# Patient Record
Sex: FEMALE | NOT HISPANIC OR LATINO | Employment: OTHER | URBAN - NONMETROPOLITAN AREA
[De-identification: names, ages, dates, MRNs, and addresses within clinical notes are randomized per-mention and may not be internally consistent; named-entity substitution may affect disease eponyms.]

---

## 2018-04-05 ENCOUNTER — OFFICE VISIT (OUTPATIENT)
Dept: GASTROENTEROLOGY | Facility: CLINIC | Age: 53
End: 2018-04-05

## 2018-04-05 ENCOUNTER — PREP FOR SURGERY (OUTPATIENT)
Dept: OTHER | Facility: HOSPITAL | Age: 53
End: 2018-04-05

## 2018-04-05 VITALS
RESPIRATION RATE: 16 BRPM | HEIGHT: 61 IN | SYSTOLIC BLOOD PRESSURE: 138 MMHG | DIASTOLIC BLOOD PRESSURE: 77 MMHG | HEART RATE: 83 BPM | TEMPERATURE: 98.5 F | WEIGHT: 181 LBS | BODY MASS INDEX: 34.17 KG/M2

## 2018-04-05 DIAGNOSIS — R11.0 NAUSEA: Chronic | ICD-10-CM

## 2018-04-05 DIAGNOSIS — R13.10 DYSPHAGIA, UNSPECIFIED TYPE: Chronic | ICD-10-CM

## 2018-04-05 DIAGNOSIS — R13.10 DYSPHAGIA, UNSPECIFIED TYPE: ICD-10-CM

## 2018-04-05 DIAGNOSIS — R11.0 NAUSEA: Primary | ICD-10-CM

## 2018-04-05 DIAGNOSIS — Z12.11 COLON CANCER SCREENING: Primary | ICD-10-CM

## 2018-04-05 DIAGNOSIS — R19.7 DIARRHEA, UNSPECIFIED TYPE: ICD-10-CM

## 2018-04-05 DIAGNOSIS — R19.7 DIARRHEA, UNSPECIFIED TYPE: Primary | ICD-10-CM

## 2018-04-05 DIAGNOSIS — Z12.11 COLON CANCER SCREENING: ICD-10-CM

## 2018-04-05 PROBLEM — E78.5 HYPERLIPIDEMIA LDL GOAL <130: Status: ACTIVE | Noted: 2018-04-05

## 2018-04-05 PROBLEM — E11.9 TYPE 2 DIABETES MELLITUS WITHOUT COMPLICATION, WITHOUT LONG-TERM CURRENT USE OF INSULIN (HCC): Status: ACTIVE | Noted: 2018-04-05

## 2018-04-05 PROBLEM — Z13.89 ENCOUNTER FOR SCREENING FOR OTHER DISORDER: Status: ACTIVE | Noted: 2018-02-20

## 2018-04-05 PROBLEM — I10 ESSENTIAL HYPERTENSION: Status: ACTIVE | Noted: 2018-04-05

## 2018-04-05 PROCEDURE — 99244 OFF/OP CNSLTJ NEW/EST MOD 40: CPT | Performed by: NURSE PRACTITIONER

## 2018-04-05 RX ORDER — SODIUM CHLORIDE 9 MG/ML
70 INJECTION, SOLUTION INTRAVENOUS CONTINUOUS PRN
Status: CANCELLED | OUTPATIENT
Start: 2018-04-05

## 2018-04-05 RX ORDER — LORATADINE 10 MG/1
10 TABLET ORAL DAILY
COMMUNITY

## 2018-04-05 RX ORDER — ROSUVASTATIN CALCIUM 10 MG/1
10 TABLET, COATED ORAL DAILY
COMMUNITY

## 2018-04-05 RX ORDER — HYDROCHLOROTHIAZIDE 12.5 MG/1
12.5 TABLET ORAL DAILY
COMMUNITY

## 2018-04-05 RX ORDER — LOSARTAN POTASSIUM 25 MG/1
25 TABLET ORAL 2 TIMES DAILY
COMMUNITY

## 2018-04-05 RX ORDER — VENLAFAXINE HYDROCHLORIDE 75 MG/1
75 CAPSULE, EXTENDED RELEASE ORAL DAILY
COMMUNITY

## 2018-04-05 NOTE — PATIENT INSTRUCTIONS
1. Zofran 4 mg 1 po every 8 hours as needed for nausea.  2. Gastrointestinal stool panel.  3. Upper endoscopy-EGD: Description of the procedure, risks, benefits, alternatives and options, including nonoperative options, were discussed with the patient in detail. The patient understands and wishes to proceed.  4. Colonoscopy: Description of the procedure, risks, benefits, alternatives and options, including nonoperative options, were discussed with the patient in detail. The patient understands and wishes to proceed.

## 2018-04-05 NOTE — PROGRESS NOTES
"Chief Complaint   Patient presents with   • Colon Cancer Screening   • Nausea   • Hematochezia   • Diarrhea     The patient has a history of diarrhea that started about 3 days ago. The patient had been taking Cipro for a urinary tract infection. She stopped the Cipro 2 days ago, but the diarrhea has continued. The patient is having a bowel movement every 30 minutes around the clock for the past 3 days. Diarrhea is severe. Diarrhea is very watery. The patient wakes at night with diarrhea. The patient denies bright red blood per rectum or melena. The patient is not taking anything for diarrhea.The patient denies abdominal pain, but she does feel \"gassy\".     The patient has a sensation of \"something\" in her esophagus. The patient had multiple episodes of nausea with vomiting about 1 month ago that lasted about 15 minutes. The patient has not had any further episodes of vomiting, but she has continued to feel nauseated. The patient also feels like she has \"something\" in her throat. No history of hematemesis. The patient is not taking anything for the nausea. The patient does not have difficulty swallowing liquids or solids, but drinking coffee will make her feel \"sick\". There is no history of heartburn.    The patient has not had a colonoscopy in the past. There is no family history of colon cancer.    Nausea   This is a recurrent problem. The current episode started more than 1 month ago. The problem occurs intermittently. The problem has been waxing and waning. Associated symptoms include a fever, myalgias and nausea. Pertinent negatives include no abdominal pain, arthralgias, chest pain, chills, coughing, fatigue, headaches, joint swelling, rash or vomiting. The symptoms are aggravated by drinking. She has tried nothing for the symptoms.   Diarrhea    This is a new problem. Episode onset: 4/2/2018. The problem occurs more than 10 times per day. The problem has been unchanged. The stool consistency is described as " watery. The patient states that diarrhea awakens her from sleep. Associated symptoms include a fever and myalgias. Pertinent negatives include no abdominal pain, arthralgias, chills, coughing, headaches or vomiting. Nothing aggravates the symptoms. Risk factors include recent antibiotic use. She has tried nothing for the symptoms.   Difficulty Swallowing   This is a recurrent problem. The current episode started more than 1 month ago. The problem occurs intermittently. The problem has been waxing and waning. Associated symptoms include a fever, myalgias and nausea. Pertinent negatives include no abdominal pain, arthralgias, chest pain, chills, coughing, fatigue, headaches, joint swelling, rash or vomiting. The symptoms are aggravated by eating. She has tried nothing for the symptoms.      Review of Systems   Constitutional: Positive for fever. Negative for appetite change, chills, fatigue and unexpected weight change.   HENT: Negative for mouth sores, nosebleeds and trouble swallowing.    Eyes: Negative for discharge and redness.   Respiratory: Negative for apnea, cough and shortness of breath.    Cardiovascular: Negative for chest pain, palpitations and leg swelling.   Gastrointestinal: Positive for blood in stool, diarrhea, hematochezia and nausea. Negative for abdominal distention, abdominal pain, anal bleeding, constipation and vomiting.   Endocrine: Negative for cold intolerance, heat intolerance and polydipsia.   Genitourinary: Positive for dysuria and hematuria. Negative for urgency.   Musculoskeletal: Positive for myalgias. Negative for arthralgias and joint swelling.   Skin: Negative for rash.   Allergic/Immunologic: Positive for food allergies (Fruits:  Banana and canteloupe are the worst.). Negative for immunocompromised state.   Neurological: Negative for dizziness, seizures, syncope and headaches.   Hematological: Positive for adenopathy (in the groin area). Does not bruise/bleed easily.  "  Psychiatric/Behavioral: Negative for dysphoric mood. The patient is not nervous/anxious and is not hyperactive.      Patient Active Problem List   Diagnosis   • Body mass index (bmi) 33.0-33.9, adult   • Diarrhea   • Encounter for screening for other disorder   • Essential hypertension   • Hyperlipidemia LDL goal <130   • Colon cancer screening   • Type 2 diabetes mellitus without complication, without long-term current use of insulin   • Nausea   • Dysphagia     Past Medical History:   Diagnosis Date   • Diabetes 2015   • Fatigue    • HTN (hypertension) 2014   • OA (osteoarthritis) 1995   • Snores    • Tattoo      Past Surgical History:   Procedure Laterality Date   • HYSTERECTOMY  1999    left 1 ovary   • ORBITAL RECONSTRUCTION  2008    Facial/Orbital    • REPLACEMENT TOTAL KNEE Bilateral 2014, 2015     Family History   Problem Relation Age of Onset   • Liver cancer Maternal Uncle      x 2   • Colon cancer Neg Hx    • Inflammatory bowel disease Neg Hx      Social History   Substance Use Topics   • Smoking status: Former Smoker     Packs/day: 0.50     Types: Cigarettes     Start date: 1988     Quit date: 1989   • Smokeless tobacco: Never Used   • Alcohol use Not on file      Comment: \"Social\"       Current Outpatient Prescriptions:   •  hydrochlorothiazide (HYDRODIURIL) 12.5 MG tablet, Take 12.5 mg by mouth Daily., Disp: , Rfl:   •  loratadine (CLARITIN) 10 MG tablet, Take 10 mg by mouth Daily., Disp: , Rfl:   •  losartan (COZAAR) 25 MG tablet, Take 25 mg by mouth 2 (Two) Times a Day., Disp: , Rfl:   •  metFORMIN (GLUCOPHAGE) 1000 MG tablet, Take 1,000 mg by mouth 2 (Two) Times a Day., Disp: , Rfl:   •  MULTIPLE VITAMIN PO, Take 1 tablet by mouth Daily., Disp: , Rfl:   •  Probiotic Product (PROBIOTIC DAILY PO), Take 1 capsule by mouth Daily., Disp: , Rfl:   •  rosuvastatin (CRESTOR) 10 MG tablet, Take 10 mg by mouth Daily., Disp: , Rfl:   •  venlafaxine XR (EFFEXOR-XR) 75 MG 24 hr capsule, Take 75 mg by mouth " "Daily., Disp: , Rfl:     Allergies   Allergen Reactions   • Penicillins Anaphylaxis   • Codeine GI Intolerance     \"Constipation, Ringing in the ears\"   • Latex Rash     /77   Pulse 83   Temp 98.5 °F (36.9 °C)   Resp 16   Ht 154.9 cm (61\")   Wt 82.1 kg (181 lb)   LMP  (LMP Unknown)   BMI 34.20 kg/m²     Physical Exam   Constitutional: She is oriented to person, place, and time. She appears well-developed and well-nourished. No distress.   HENT:   Head: Normocephalic and atraumatic.   Right Ear: Hearing and external ear normal.   Left Ear: Hearing and external ear normal.   Nose: Nose normal.   Mouth/Throat: Oropharynx is clear and moist and mucous membranes are normal. Mucous membranes are not pale, not dry and not cyanotic. No oral lesions. No oropharyngeal exudate.   Eyes: Conjunctivae and EOM are normal. Right eye exhibits no discharge. Left eye exhibits no discharge.   Neck: Trachea normal. Neck supple. No JVD present. No edema present. No thyroid mass and no thyromegaly present.   Cardiovascular: Normal rate, regular rhythm, S2 normal and normal heart sounds.  Exam reveals no gallop, no S3 and no friction rub.    No murmur heard.  Pulmonary/Chest: Effort normal and breath sounds normal. No respiratory distress. She exhibits no tenderness.   Abdominal: Normal appearance and bowel sounds are normal. She exhibits no distension, no ascites and no mass. There is no splenomegaly or hepatomegaly. There is tenderness (mild) in the suprapubic area. There is no rigidity, no rebound and no guarding. No hernia.     Vascular Status -  Her right foot exhibits no edema. Her left foot exhibits no edema.  Lymphadenopathy:     She has no cervical adenopathy.        Left: No supraclavicular adenopathy present.   Neurological: She is alert and oriented to person, place, and time. She has normal strength. No cranial nerve deficit or sensory deficit.   Skin: No rash noted. She is not diaphoretic. No cyanosis. No pallor. " Nails show no clubbing.   Psychiatric: She has a normal mood and affect.   Nursing note and vitals reviewed.  Stigmata of chronic liver disease:  None.  Asterixis:  None.    Assessment and Plan:    Nataliia was seen today for colon cancer screening, nausea, hematochezia and diarrhea.    Diagnoses and all orders for this visit:    Diarrhea, unspecified type  Comments:  Differentials include bacterial infection, microscopic colitis, underlying inflammatory bowel disease.  Orders:  -     Gastrointestinal Panel, PCR - Stool, Per Rectum; Future    Nausea  Comments:  Differentials include peptic ulcer disease, pancreatobiliary disease.    Dysphagia, unspecified type  Comments:  Differentials include esophagitis, esophageal dysmotility, Schatzki's ring.    Colon cancer screening  Comments:  The patient has not had a colonoscopy in the past.  There is no family history of colon cancer.        Plan  and Patient Instructions:  Patient Instructions   1. Zofran 4 mg 1 po every 8 hours as needed for nausea.  2. Gastrointestinal stool panel.  3. Upper endoscopy-EGD: Description of the procedure, risks, benefits, alternatives and options, including nonoperative options, were discussed with the patient in detail. The patient understands and wishes to proceed.  4. Colonoscopy: Description of the procedure, risks, benefits, alternatives and options, including nonoperative options, were discussed with the patient in detail. The patient understands and wishes to proceed.    CHANCE Carmona

## 2018-04-06 ENCOUNTER — LAB (OUTPATIENT)
Dept: LAB | Facility: HOSPITAL | Age: 53
End: 2018-04-06

## 2018-04-06 DIAGNOSIS — R19.7 DIARRHEA, UNSPECIFIED TYPE: ICD-10-CM

## 2018-04-06 LAB
ADV 40+41 DNA STL QL NAA+NON-PROBE: NOT DETECTED
ASTRO TYP 1-8 RNA STL QL NAA+NON-PROBE: NOT DETECTED
C CAYETANENSIS DNA STL QL NAA+NON-PROBE: NOT DETECTED
C DIFF TOX GENS STL QL NAA+PROBE: NOT DETECTED
CAMPY SP DNA.DIARRHEA STL QL NAA+PROBE: NOT DETECTED
CRYPTOSP STL CULT: NOT DETECTED
E COLI DNA SPEC QL NAA+PROBE: NOT DETECTED
E HISTOLYT AG STL-ACNC: NOT DETECTED
EAEC PAA PLAS AGGR+AATA ST NAA+NON-PRB: NOT DETECTED
EC STX1+STX2 GENES STL QL NAA+NON-PROBE: NOT DETECTED
EPEC EAE GENE STL QL NAA+NON-PROBE: NOT DETECTED
ETEC LTA+ST1A+ST1B TOX ST NAA+NON-PROBE: NOT DETECTED
G LAMBLIA DNA SPEC QL NAA+PROBE: NOT DETECTED
NOROVIRUS GI+II RNA STL QL NAA+NON-PROBE: NOT DETECTED
P SHIGELLOIDES DNA STL QL NAA+NON-PROBE: NOT DETECTED
RV RNA STL NAA+PROBE: NOT DETECTED
SALMONELLA DNA SPEC QL NAA+PROBE: NOT DETECTED
SAPO I+II+IV+V RNA STL QL NAA+NON-PROBE: NOT DETECTED
SHIGELLA SP+EIEC IPAH ST NAA+NON-PROBE: NOT DETECTED
V CHOLERAE DNA SPEC QL NAA+PROBE: NOT DETECTED
VIBRIO DNA SPEC NAA+PROBE: NOT DETECTED
YERSINIA STL CULT: NOT DETECTED

## 2018-04-06 PROCEDURE — 87507 IADNA-DNA/RNA PROBE TQ 12-25: CPT

## 2018-04-07 ENCOUNTER — APPOINTMENT (OUTPATIENT)
Dept: CT IMAGING | Facility: HOSPITAL | Age: 53
End: 2018-04-07

## 2018-04-07 ENCOUNTER — HOSPITAL ENCOUNTER (EMERGENCY)
Facility: HOSPITAL | Age: 53
Discharge: HOME | End: 2018-04-07
Attending: EMERGENCY MEDICINE | Admitting: EMERGENCY MEDICINE

## 2018-04-07 VITALS
TEMPERATURE: 98.3 F | HEIGHT: 61 IN | OXYGEN SATURATION: 98 % | HEART RATE: 76 BPM | SYSTOLIC BLOOD PRESSURE: 130 MMHG | RESPIRATION RATE: 18 BRPM | WEIGHT: 176 LBS | BODY MASS INDEX: 33.23 KG/M2 | DIASTOLIC BLOOD PRESSURE: 96 MMHG

## 2018-04-07 DIAGNOSIS — R19.7 DIARRHEA, UNSPECIFIED TYPE: Primary | ICD-10-CM

## 2018-04-07 LAB
ALBUMIN SERPL-MCNC: 4.4 G/DL (ref 3.5–5)
ALBUMIN/GLOB SERPL: 1.2 G/DL (ref 1–2)
ALP SERPL-CCNC: 80 U/L (ref 38–126)
ALT SERPL W P-5'-P-CCNC: 70 U/L (ref 13–69)
ANION GAP SERPL CALCULATED.3IONS-SCNC: 16.9 MMOL/L (ref 10–20)
AST SERPL-CCNC: 31 U/L (ref 15–46)
BACTERIA UR QL AUTO: ABNORMAL /HPF
BASOPHILS # BLD AUTO: 0.05 10*3/MM3 (ref 0–0.2)
BASOPHILS NFR BLD AUTO: 0.5 % (ref 0–2.5)
BILIRUB SERPL-MCNC: 0.4 MG/DL (ref 0.2–1.3)
BILIRUB UR QL STRIP: NEGATIVE
BUN BLD-MCNC: 12 MG/DL (ref 7–20)
BUN/CREAT SERPL: 20 (ref 7.1–23.5)
CALCIUM SPEC-SCNC: 10 MG/DL (ref 8.4–10.2)
CHLORIDE SERPL-SCNC: 102 MMOL/L (ref 98–107)
CLARITY UR: CLEAR
CO2 SERPL-SCNC: 29 MMOL/L (ref 26–30)
COLOR UR: YELLOW
CREAT BLD-MCNC: 0.6 MG/DL (ref 0.6–1.3)
D-LACTATE SERPL-SCNC: 1.1 MMOL/L (ref 0.5–2)
DEPRECATED RDW RBC AUTO: 42.3 FL (ref 37–54)
EOSINOPHIL # BLD AUTO: 0.34 10*3/MM3 (ref 0–0.7)
EOSINOPHIL NFR BLD AUTO: 3.6 % (ref 0–7)
ERYTHROCYTE [DISTWIDTH] IN BLOOD BY AUTOMATED COUNT: 12.9 % (ref 11.5–14.5)
GFR SERPL CREATININE-BSD FRML MDRD: 104 ML/MIN/1.73
GFR SERPL CREATININE-BSD FRML MDRD: 126 ML/MIN/1.73
GLOBULIN UR ELPH-MCNC: 3.7 GM/DL
GLUCOSE BLD-MCNC: 97 MG/DL (ref 74–98)
GLUCOSE UR STRIP-MCNC: NEGATIVE MG/DL
HCT VFR BLD AUTO: 38 % (ref 37–47)
HGB BLD-MCNC: 12.6 G/DL (ref 12–16)
HGB UR QL STRIP.AUTO: NEGATIVE
HYALINE CASTS UR QL AUTO: ABNORMAL /LPF
IMM GRANULOCYTES # BLD: 0.15 10*3/MM3 (ref 0–0.06)
IMM GRANULOCYTES NFR BLD: 1.6 % (ref 0–0.6)
KETONES UR QL STRIP: NEGATIVE
LEUKOCYTE ESTERASE UR QL STRIP.AUTO: ABNORMAL
LIPASE SERPL-CCNC: 131 U/L (ref 23–300)
LYMPHOCYTES # BLD AUTO: 2.85 10*3/MM3 (ref 0.6–3.4)
LYMPHOCYTES NFR BLD AUTO: 30.4 % (ref 10–50)
MCH RBC QN AUTO: 29.6 PG (ref 27–31)
MCHC RBC AUTO-ENTMCNC: 33.2 G/DL (ref 30–37)
MCV RBC AUTO: 89.2 FL (ref 81–99)
MONOCYTES # BLD AUTO: 0.62 10*3/MM3 (ref 0–0.9)
MONOCYTES NFR BLD AUTO: 6.6 % (ref 0–12)
NEUTROPHILS # BLD AUTO: 5.38 10*3/MM3 (ref 2–6.9)
NEUTROPHILS NFR BLD AUTO: 57.3 % (ref 37–80)
NITRITE UR QL STRIP: NEGATIVE
NRBC BLD MANUAL-RTO: 0 /100 WBC (ref 0–0)
PH UR STRIP.AUTO: 5.5 [PH] (ref 5–8)
PLAT MORPH BLD: NORMAL
PLATELET # BLD AUTO: 322 10*3/MM3 (ref 130–400)
PMV BLD AUTO: 8.9 FL (ref 6–12)
POTASSIUM BLD-SCNC: 3.9 MMOL/L (ref 3.5–5.1)
PROT SERPL-MCNC: 8.1 G/DL (ref 6.3–8.2)
PROT UR QL STRIP: NEGATIVE
RBC # BLD AUTO: 4.26 10*6/MM3 (ref 4.2–5.4)
RBC # UR: ABNORMAL /HPF
RBC MORPH BLD: NORMAL
REF LAB TEST METHOD: ABNORMAL
SODIUM BLD-SCNC: 144 MMOL/L (ref 137–145)
SP GR UR STRIP: 1.01 (ref 1–1.03)
SQUAMOUS #/AREA URNS HPF: ABNORMAL /HPF
TRANS CELLS #/AREA URNS HPF: ABNORMAL /HPF
UROBILINOGEN UR QL STRIP: ABNORMAL
WBC MORPH BLD: NORMAL
WBC NRBC COR # BLD: 9.39 10*3/MM3 (ref 4.8–10.8)
WBC UR QL AUTO: ABNORMAL /HPF

## 2018-04-07 PROCEDURE — 81001 URINALYSIS AUTO W/SCOPE: CPT | Performed by: PHYSICIAN ASSISTANT

## 2018-04-07 PROCEDURE — 99283 EMERGENCY DEPT VISIT LOW MDM: CPT

## 2018-04-07 PROCEDURE — 96374 THER/PROPH/DIAG INJ IV PUSH: CPT

## 2018-04-07 PROCEDURE — 25010000002 ONDANSETRON PER 1 MG: Performed by: PHYSICIAN ASSISTANT

## 2018-04-07 PROCEDURE — 74177 CT ABD & PELVIS W/CONTRAST: CPT

## 2018-04-07 PROCEDURE — 83605 ASSAY OF LACTIC ACID: CPT | Performed by: PHYSICIAN ASSISTANT

## 2018-04-07 PROCEDURE — 80053 COMPREHEN METABOLIC PANEL: CPT | Performed by: PHYSICIAN ASSISTANT

## 2018-04-07 PROCEDURE — 85025 COMPLETE CBC W/AUTO DIFF WBC: CPT | Performed by: PHYSICIAN ASSISTANT

## 2018-04-07 PROCEDURE — 83690 ASSAY OF LIPASE: CPT | Performed by: PHYSICIAN ASSISTANT

## 2018-04-07 PROCEDURE — 85007 BL SMEAR W/DIFF WBC COUNT: CPT | Performed by: PHYSICIAN ASSISTANT

## 2018-04-07 PROCEDURE — 25010000002 IOPAMIDOL 61 % SOLUTION: Performed by: EMERGENCY MEDICINE

## 2018-04-07 PROCEDURE — 87086 URINE CULTURE/COLONY COUNT: CPT | Performed by: PHYSICIAN ASSISTANT

## 2018-04-07 PROCEDURE — 96361 HYDRATE IV INFUSION ADD-ON: CPT

## 2018-04-07 RX ORDER — ONDANSETRON 2 MG/ML
4 INJECTION INTRAMUSCULAR; INTRAVENOUS ONCE
Status: COMPLETED | OUTPATIENT
Start: 2018-04-07 | End: 2018-04-07

## 2018-04-07 RX ORDER — SODIUM CHLORIDE 0.9 % (FLUSH) 0.9 %
10 SYRINGE (ML) INJECTION AS NEEDED
Status: DISCONTINUED | OUTPATIENT
Start: 2018-04-07 | End: 2018-04-07 | Stop reason: HOSPADM

## 2018-04-07 RX ADMIN — SODIUM CHLORIDE 1000 ML: 9 INJECTION, SOLUTION INTRAVENOUS at 16:37

## 2018-04-07 RX ADMIN — IOPAMIDOL 100 ML: 612 INJECTION, SOLUTION INTRAVENOUS at 17:46

## 2018-04-07 RX ADMIN — ONDANSETRON 4 MG: 2 INJECTION, SOLUTION INTRAMUSCULAR; INTRAVENOUS at 16:36

## 2018-04-07 NOTE — ED PROVIDER NOTES
"Subjective   52-year-old female presenting to the ER with complaints of recent diarrhea and abdominal pain.    She indicates that about 10 days ago she developed some dysuria and thought she might have a UTI.  She ended up going to the urgent care center and was placed on Cipro.  2 or 3 days after starting the Cipro she said she developed \"explosive diarrhea\" there was no blood, fever, chills or severe abdominal pain.  This actually ended up calming down but she did check with her PCP and was advised to stop the Cipro.  A stool culture for Clostridium difficile was obtained and taken to the lab yesterday-results pending.  She was placed on Flagyl over the past 24 hours and so far has taken 3 doses.  She said her diarrhea is no longer just water but it does have some jellylike consistency.  There is no blood, fever, chills.  She has had some episodic abdominal pain but very fleeting.  No back pain and currently no UTI symptoms.  He is using a probiotic as well.  She's never had any pertinent GI history.  She is a nonsmoker.  She did undergo partial hysterectomy previously.  Earlier this morning she was having some moderate severe crampy pain but on arrival here she said she did not even need any pain medicine but did have some nausea.  He is not having any vomiting.  Appetite is decreased.            Review of Systems   All other systems reviewed and are negative.      Past Medical History:   Diagnosis Date   • Diabetes 2015   • Fatigue    • HTN (hypertension) 2014   • OA (osteoarthritis) 1995   • Snores    • Tattoo        Allergies   Allergen Reactions   • Penicillins Anaphylaxis   • Codeine GI Intolerance     \"Constipation, Ringing in the ears\"   • Latex Rash       Past Surgical History:   Procedure Laterality Date   • HYSTERECTOMY  1999    left 1 ovary   • ORBITAL RECONSTRUCTION  2008    Facial/Orbital    • REPLACEMENT TOTAL KNEE Bilateral 2014, 2015       Family History   Problem Relation Age of Onset   • Liver " cancer Maternal Uncle      x 2   • Colon cancer Neg Hx    • Inflammatory bowel disease Neg Hx        Social History     Social History   • Marital status: Unknown     Social History Main Topics   • Smoking status: Former Smoker     Packs/day: 0.50     Types: Cigarettes     Start date: 1988     Quit date: 1989   • Smokeless tobacco: Never Used   • Drug use: No   • Sexual activity: Defer     Other Topics Concern   • Not on file           Objective   Physical Exam   Constitutional: She appears well-developed and well-nourished. No distress.   Very pleasant overweight female no acute distress does not appear to be in pain   HENT:   Head: Normocephalic and atraumatic.   Nose: Nose normal.   Mouth/Throat: Oropharynx is clear and moist.   Eyes: Conjunctivae and EOM are normal. No scleral icterus.   Neck: Normal range of motion.   Cardiovascular: Normal rate, regular rhythm, normal heart sounds and intact distal pulses.    No murmur heard.  Pulmonary/Chest: Effort normal and breath sounds normal. No respiratory distress. She has no wheezes. She has no rales.   Abdominal: Soft. Bowel sounds are normal.   Bowel sounds are present throughout abdomen is nonrigid.  She does have mild to moderate diffuse upper abdominal tenderness and mild to moderate left lower quadrant tenderness.  No mass guarding or rebound.  No pulsatile mass.  No CVA tenderness.   Musculoskeletal: Normal range of motion. She exhibits no edema or tenderness.   Neurological: She is alert. No cranial nerve deficit. She exhibits normal muscle tone. Coordination normal.   Skin: Skin is warm. Capillary refill takes less than 2 seconds. No rash noted. She is not diaphoretic.   Psychiatric: She has a normal mood and affect. Her behavior is normal. Thought content normal.   Vitals reviewed.      Procedures         ED Course  ED Course   Comment By Time   Patient has had an uneventful ED course.  She did request Zofran but did not need anything for pain.   Fortunately, her CT scan only shows some gallbladder sludge and a fatty liver but no other acute intra-abdominal process.  Her labs are very reassuring with no leukocytosis, anemia, elevated lactate or electrolyte abnormality.  I told her that she should continue with her Flagyl as previously prescribed and try to follow up with her PCP over the next few days-first of the week Dave Alexandra PA-C 04/07 1818                  MDM    Final diagnoses:   Diarrhea, unspecified type            Dave Alexandra PA-C  04/07/18 [id number]

## 2018-04-07 NOTE — DISCHARGE INSTRUCTIONS
Please continue all medicines as previously prescribed.  Try to follow-up with your PCP this Monday or Tuesday.  Return to the emergency room any time if you develop any significant change-worsening of current symptoms

## 2018-04-09 ENCOUNTER — TELEPHONE (OUTPATIENT)
Dept: GASTROENTEROLOGY | Facility: CLINIC | Age: 53
End: 2018-04-09

## 2018-04-09 LAB — BACTERIA SPEC AEROBE CULT: NO GROWTH

## 2018-04-09 NOTE — TELEPHONE ENCOUNTER
----- Message from CHANCE Chester sent at 4/9/2018  9:03 AM EDT -----  Let the patient know her stool tests were negative.

## 2018-05-07 ENCOUNTER — ANESTHESIA (OUTPATIENT)
Dept: GASTROENTEROLOGY | Facility: HOSPITAL | Age: 53
End: 2018-05-07

## 2018-05-07 ENCOUNTER — HOSPITAL ENCOUNTER (OUTPATIENT)
Facility: HOSPITAL | Age: 53
Setting detail: HOSPITAL OUTPATIENT SURGERY
Discharge: HOME | End: 2018-05-07
Attending: INTERNAL MEDICINE | Admitting: INTERNAL MEDICINE

## 2018-05-07 ENCOUNTER — ANESTHESIA EVENT (OUTPATIENT)
Dept: GASTROENTEROLOGY | Facility: HOSPITAL | Age: 53
End: 2018-05-07

## 2018-05-07 VITALS
BODY MASS INDEX: 33.23 KG/M2 | RESPIRATION RATE: 18 BRPM | TEMPERATURE: 98.4 F | HEIGHT: 61 IN | OXYGEN SATURATION: 98 % | DIASTOLIC BLOOD PRESSURE: 75 MMHG | HEART RATE: 72 BPM | WEIGHT: 176 LBS | SYSTOLIC BLOOD PRESSURE: 124 MMHG

## 2018-05-07 DIAGNOSIS — R11.0 NAUSEA: ICD-10-CM

## 2018-05-07 DIAGNOSIS — R19.7 DIARRHEA, UNSPECIFIED TYPE: ICD-10-CM

## 2018-05-07 DIAGNOSIS — Z12.11 COLON CANCER SCREENING: ICD-10-CM

## 2018-05-07 DIAGNOSIS — R13.10 DYSPHAGIA, UNSPECIFIED TYPE: ICD-10-CM

## 2018-05-07 LAB — GLUCOSE BLDC GLUCOMTR-MCNC: 120 MG/DL (ref 70–130)

## 2018-05-07 PROCEDURE — 82962 GLUCOSE BLOOD TEST: CPT

## 2018-05-07 PROCEDURE — 25010000002 PROPOFOL 200 MG/20ML EMULSION: Performed by: NURSE ANESTHETIST, CERTIFIED REGISTERED

## 2018-05-07 PROCEDURE — 45380 COLONOSCOPY AND BIOPSY: CPT | Performed by: INTERNAL MEDICINE

## 2018-05-07 PROCEDURE — 45385 COLONOSCOPY W/LESION REMOVAL: CPT | Performed by: INTERNAL MEDICINE

## 2018-05-07 PROCEDURE — 43239 EGD BIOPSY SINGLE/MULTIPLE: CPT | Performed by: INTERNAL MEDICINE

## 2018-05-07 PROCEDURE — 25010000002 MIDAZOLAM PER 1 MG: Performed by: NURSE ANESTHETIST, CERTIFIED REGISTERED

## 2018-05-07 DEVICE — DEV CLIP GI QUICKCLIPPRO FIX ROT 2300MM: Type: IMPLANTABLE DEVICE | Site: DESCENDING COLON | Status: FUNCTIONAL

## 2018-05-07 RX ORDER — PROPOFOL 10 MG/ML
INJECTION, EMULSION INTRAVENOUS AS NEEDED
Status: DISCONTINUED | OUTPATIENT
Start: 2018-05-07 | End: 2018-05-07 | Stop reason: SURG

## 2018-05-07 RX ORDER — RANITIDINE 150 MG/1
150 TABLET ORAL 2 TIMES DAILY
Qty: 60 TABLET | Refills: 1 | Status: SHIPPED | OUTPATIENT
Start: 2018-05-07

## 2018-05-07 RX ORDER — SODIUM CHLORIDE 0.9 % (FLUSH) 0.9 %
3 SYRINGE (ML) INJECTION AS NEEDED
Status: DISCONTINUED | OUTPATIENT
Start: 2018-05-07 | End: 2018-05-07 | Stop reason: HOSPADM

## 2018-05-07 RX ORDER — MEPERIDINE HYDROCHLORIDE 50 MG/ML
INJECTION INTRAMUSCULAR; INTRAVENOUS; SUBCUTANEOUS AS NEEDED
Status: DISCONTINUED | OUTPATIENT
Start: 2018-05-07 | End: 2018-05-07 | Stop reason: SURG

## 2018-05-07 RX ORDER — MIDAZOLAM HYDROCHLORIDE 1 MG/ML
INJECTION INTRAMUSCULAR; INTRAVENOUS AS NEEDED
Status: DISCONTINUED | OUTPATIENT
Start: 2018-05-07 | End: 2018-05-07 | Stop reason: SURG

## 2018-05-07 RX ORDER — SODIUM CHLORIDE 9 MG/ML
70 INJECTION, SOLUTION INTRAVENOUS CONTINUOUS PRN
Status: DISCONTINUED | OUTPATIENT
Start: 2018-05-07 | End: 2018-05-07 | Stop reason: HOSPADM

## 2018-05-07 RX ADMIN — PROPOFOL 30 MG: 10 INJECTION, EMULSION INTRAVENOUS at 12:48

## 2018-05-07 RX ADMIN — SODIUM CHLORIDE 70 ML/HR: 9 INJECTION, SOLUTION INTRAVENOUS at 10:12

## 2018-05-07 RX ADMIN — MEPERIDINE HYDROCHLORIDE 50 MG: 50 INJECTION, SOLUTION INTRAMUSCULAR; INTRAVENOUS; SUBCUTANEOUS at 12:02

## 2018-05-07 RX ADMIN — PROPOFOL 30 MG: 10 INJECTION, EMULSION INTRAVENOUS at 12:44

## 2018-05-07 RX ADMIN — PROPOFOL 30 MG: 10 INJECTION, EMULSION INTRAVENOUS at 12:50

## 2018-05-07 RX ADMIN — PROPOFOL 30 MG: 10 INJECTION, EMULSION INTRAVENOUS at 12:37

## 2018-05-07 RX ADMIN — PROPOFOL 30 MG: 10 INJECTION, EMULSION INTRAVENOUS at 12:25

## 2018-05-07 RX ADMIN — PROPOFOL 30 MG: 10 INJECTION, EMULSION INTRAVENOUS at 12:15

## 2018-05-07 RX ADMIN — PROPOFOL 30 MG: 10 INJECTION, EMULSION INTRAVENOUS at 12:12

## 2018-05-07 RX ADMIN — PROPOFOL 30 MG: 10 INJECTION, EMULSION INTRAVENOUS at 12:22

## 2018-05-07 RX ADMIN — PROPOFOL 30 MG: 10 INJECTION, EMULSION INTRAVENOUS at 12:46

## 2018-05-07 RX ADMIN — MIDAZOLAM HYDROCHLORIDE 2 MG: 1 INJECTION, SOLUTION INTRAMUSCULAR; INTRAVENOUS at 12:01

## 2018-05-07 RX ADMIN — PROPOFOL 30 MG: 10 INJECTION, EMULSION INTRAVENOUS at 12:10

## 2018-05-07 RX ADMIN — PROPOFOL 30 MG: 10 INJECTION, EMULSION INTRAVENOUS at 12:20

## 2018-05-07 RX ADMIN — PROPOFOL 30 MG: 10 INJECTION, EMULSION INTRAVENOUS at 12:42

## 2018-05-07 RX ADMIN — PROPOFOL 30 MG: 10 INJECTION, EMULSION INTRAVENOUS at 12:06

## 2018-05-07 RX ADMIN — PROPOFOL 30 MG: 10 INJECTION, EMULSION INTRAVENOUS at 12:30

## 2018-05-07 RX ADMIN — LIDOCAINE HYDROCHLORIDE 40 MG: 20 INJECTION, SOLUTION INTRAVENOUS at 12:41

## 2018-05-07 RX ADMIN — PROPOFOL 30 MG: 10 INJECTION, EMULSION INTRAVENOUS at 12:17

## 2018-05-07 RX ADMIN — PROPOFOL 30 MG: 10 INJECTION, EMULSION INTRAVENOUS at 12:40

## 2018-05-07 NOTE — H&P
"Chief complaint:  Diarrhea/Dysphagia    History of present illness: No previous colonscopy, Diarrhea, \"Gassy\" feeling, Dysphagia (feels like something in her esophagus), Nausea     Past medical history:   Past Medical History:   Diagnosis Date   • Diabetes 2015   • Fatigue    • High cholesterol    • HTN (hypertension) 2014   • OA (osteoarthritis) 1995   • Snores    • Tattoo    • Wears contact lenses        Surgical history:    Past Surgical History:   Procedure Laterality Date   • HYSTERECTOMY  1999    left 1 ovary   • ORBITAL RECONSTRUCTION  2008    Facial/Orbital    • REPLACEMENT TOTAL KNEE Bilateral 2014, 2015       Social history:  Social History     Social History   • Marital status:      Spouse name: N/A   • Number of children: N/A   • Years of education: N/A     Occupational History   • Not on file.     Social History Main Topics   • Smoking status: Former Smoker     Packs/day: 0.50     Years: 2.00     Types: Cigarettes     Start date: 1988     Quit date: 1989   • Smokeless tobacco: Never Used   • Alcohol use Not on file      Comment: \"Social\"   • Drug use: No   • Sexual activity: Defer     Other Topics Concern   • Not on file     Social History Narrative   • No narrative on file       Allergies:  Penicillins; Codeine; and Latex  Food Allergies:  Banana, Cantaloupe  Latex allergy: Yes  Contrast allergy: None    Medications:  Prescriptions Prior to Admission   Medication Sig Dispense Refill Last Dose   • hydrochlorothiazide (HYDRODIURIL) 12.5 MG tablet Take 12.5 mg by mouth Daily.   5/6/2018 at 0800   • loratadine (CLARITIN) 10 MG tablet Take 10 mg by mouth Daily.   5/6/2018 at 0800   • losartan (COZAAR) 25 MG tablet Take 25 mg by mouth 2 (Two) Times a Day.   5/6/2018 at 2100   • metFORMIN (GLUCOPHAGE) 1000 MG tablet Take 1,000 mg by mouth 2 (Two) Times a Day.   5/6/2018 at 2100   • MULTIPLE VITAMIN PO Take 1 tablet by mouth Daily.   5/6/2018 at 0800   • Probiotic Product (PROBIOTIC DAILY PO) Take 1 " "capsule by mouth Daily.   5/6/2018 at 0800   • rosuvastatin (CRESTOR) 10 MG tablet Take 10 mg by mouth Daily.   5/6/2018 at 2100   • venlafaxine XR (EFFEXOR-XR) 75 MG 24 hr capsule Take 75 mg by mouth Daily.   5/6/2018 at 0800       Review of systems:   Constitutional: No recent: Weight loss  Respiratory: No recent: SOB, Cough  Cardiovascular: No recent: Chest Pains, congestive heart failure or arrhythmias.   Neurological: No recent: Seizures, CVA, TIA.   Genitourinary: No recent: Renal Failure, UTI.  Endocrine: No recent: Worsening of diabetes or thyroid disease.  Musculoskeletal: No recent: Joint swelling.  Hem. Oncology: No recent: Anemia or bleeding.  Psychiatric: No recent: Worsening of depression or anxiety.     VITAL SIGNS:    Blood pressure 128/88, pulse 75, temperature 98.6 °F (37 °C), temperature source Temporal Artery , resp. rate 18, height 154.9 cm (61\"), weight 79.8 kg (176 lb), SpO2 96 %.    PHYSICAL EXAMINATION:   HEENT: Normal.   Lungs: Clear to auscultation.  Heart: No S3, no murmur.    Abdomen: Soft.  BS+ ND, NT  Extremities: No edema.  No cyanosis.  Neuro: Alert X 3. No focal deficit.    Assessment: No previous colonscopy, Diarrhea, \"Gassy\" feeling, Dysphagia (feels like something in her esophagus), Nausea     Plan:   Colonoscopy/Upper Endoscopy    Risks/Benefits:  The potential benefits, risk and/or side effects of the procedure and alternatives have been discussed with the patient/authorized representative and questions were answered.    "

## 2018-05-07 NOTE — ANESTHESIA PREPROCEDURE EVALUATION
Anesthesia Evaluation     Patient summary reviewed and Nursing notes reviewed   no history of anesthetic complications:  NPO Solid Status: > 8 hours  NPO Liquid Status: > 8 hours           Airway   Mallampati: I  No difficulty expected  Dental - normal exam     Pulmonary - normal exam   (+) a smoker Former,   Cardiovascular - normal exam    (+) hypertension, hyperlipidemia,       Neuro/Psych  (+) psychiatric history Anxiety and Depression,     GI/Hepatic/Renal/Endo    (+)   diabetes mellitus,     Musculoskeletal     (+) arthralgias, back pain, chronic pain,   Abdominal  - normal exam   Substance History      OB/GYN          Other   (+) arthritis                     Anesthesia Plan    ASA 3     MAC     Anesthetic plan and risks discussed with patient.

## 2018-05-07 NOTE — DISCHARGE INSTRUCTIONS
"Postprocedure instructions:    Nothing by mouth to fully alert.  Once fully alert may have clear liquid diet.  Advance diet as tolerated.  Vital signs as routine.    Diet:     Avoid Dairy Products.  Fiber One Cereal-40% Nutty Clusters 1/4 cup daily.  [place]'s All Bran-Bran Buds 1/4 cup daily.  Use Soy or [place] milk.      Blood Thinner Directions:    Avoid Aspirin & other NSAIDS for _7__ days. Tylenol is okay.    Treatments:    May take \"Imodium - AD\" over-the-counter liquid.Take 1/4 teaspoon at night orally. The dose may be increased to 1/2 teaspoon or up to 1 teaspoon at night or even twice a day if needed.   Adjust the dose to have 1-2 soft stools a day.    Other Instructions:    Call [facility] at [phone] or come to the Emergency Department if you experience the following: Chest pain, abdominal pain, bleeding (vomiting of blood or coffee colored material, black stools or juany blood in stools), fever/chills, nausea and vomiting or dizziness.      Follow-up:  DR. EDITH ISBELL in 4 weeks.Office phone # [phone].    Follow-up colonoscopy: in 3 years.            "

## 2018-05-08 NOTE — OP NOTE
PROCEDURE:  Upper Endoscopy with biopsies.    DATE OF PROCEDURE: May 7, 2018.    REFERRING PROVIDER:  CHANCE Ennis.     INSTRUMENT:  Olympus GIF H 190 video endoscope     INDICATIONS OF THE PROCEDURE:  This is a 52-year-old female with history of some foreign body sensation in her esophagus.     BIOPSIES: Second portion of duodenum.  Gastric antrum, greater curvature and body of the stomach.  3 cold biopsy polypectomies were performed in the esophagus at 18 cm, 20 cm and just distal to 20 cm.a cold biopsy polypectomy was performed at the fundus.     MEDICATIONS:  MAC.     PHOTOGRAPHS:  Photographs were included in the medical records.     CONSENT/PREPROCEDURE EVALUATION:  Risks, benefits, alternatives and options of the procedure including risks of anesthesia/sedation were discussed and informed consent was obtained prior to the procedure. History and physical examination were performed and nothing precluded the test.     REPORT:  The patient was placed in left lateral decubitus position. Once under the influence of IV sedation, the instrument was inserted into the mouth and esophagus was intubated under direct vision without difficulty.     Esophagus:  2-3 mm sessile polyps within appearance of squamous papilloma were noted in the proximal esophagus.  3 were removed 1 each at 18 cm, 20 cm and just distal to 20 cm.  Z line was noted to be around   cm. 38.  A small sliding hiatal hernia less than 3 cm was noted.  No Walker's esophagus was seen.     Stomach:  Antrum:  Erythematous gastritis.  Angulus, lesser and greater curves: significant erythema was noted at the greater curvature with suggestion of old healed ulceration. This was biopsied.  Retroflex examination: Sliding hiatal hernia.  Cardia and fundus:  A 3 mm sessile polyp in the fundus was removed with colorceps.     Body of the stomach: Erythematous-erosive gastritis.  Good distensibility of the stomach was achieved no giant folds were noted.    Biopsies were obtained from the gastric antrum and body.     Pylorus and pyloric channel:  normal.     Duodenum:  Bulb: Normal.  Second portion: normal.  No scalloping was seen in the second portion of duodenum.  Biopsies were obtained from the second portion of duodenum.    Intervention:  None.       The upper GI tract was decompressed and the scope was pulled out of the patient. The patient tolerated the procedure well.     DIAGNOSES:     1. Esophageal polyps with appearance suggestive of squamous papillomata.  2. Erythematous-erosive gastritis with evidence of old healed ulceration..  3. Gastric polyp.  4. Small sliding hiatal hernia.  5. No Walker's esophagus.    RECOMMENDATIONS:  1.  Dietary instructions.  2.  Zantac 150 mg by mouth twice a day for 4-6 weeks.  3.  Follow biopsies.  4.  Follow up in office.     Thank you very much for letting me participate in the care of this patient. Please do not hesitate to call me if you have any questions.

## 2018-05-11 LAB
LAB AP CASE REPORT: NORMAL
Lab: NORMAL
PATH REPORT.FINAL DX SPEC: NORMAL

## 2018-06-05 ENCOUNTER — OFFICE VISIT (OUTPATIENT)
Dept: GASTROENTEROLOGY | Facility: CLINIC | Age: 53
End: 2018-06-05

## 2018-06-05 VITALS
RESPIRATION RATE: 16 BRPM | SYSTOLIC BLOOD PRESSURE: 133 MMHG | HEIGHT: 61 IN | BODY MASS INDEX: 33.42 KG/M2 | HEART RATE: 95 BPM | DIASTOLIC BLOOD PRESSURE: 79 MMHG | WEIGHT: 177 LBS | TEMPERATURE: 98 F

## 2018-06-05 DIAGNOSIS — D12.3 ADENOMATOUS POLYP OF TRANSVERSE COLON: ICD-10-CM

## 2018-06-05 DIAGNOSIS — K29.50 OTHER CHRONIC GASTRITIS WITHOUT HEMORRHAGE: ICD-10-CM

## 2018-06-05 DIAGNOSIS — R19.7 DIARRHEA, UNSPECIFIED TYPE: Primary | ICD-10-CM

## 2018-06-05 DIAGNOSIS — K22.81 ESOPHAGEAL POLYP: ICD-10-CM

## 2018-06-05 DIAGNOSIS — R11.0 NAUSEA: ICD-10-CM

## 2018-06-05 DIAGNOSIS — E66.3 OVER WEIGHT: ICD-10-CM

## 2018-06-05 DIAGNOSIS — K57.30 DIVERTICULOSIS OF COLON WITHOUT HEMORRHAGE: ICD-10-CM

## 2018-06-05 PROCEDURE — 99214 OFFICE O/P EST MOD 30 MIN: CPT | Performed by: INTERNAL MEDICINE

## 2018-06-05 NOTE — PROGRESS NOTES
Chief Complaint   Patient presents with   • Diarrhea     History of Present Illness     The patient has history of diarrhea off-and-on for the last 3 months. In early April 2018 the patient had lower abdominal discomfort and was found to have UTI. She was treated with Cipro. The patient started having rather severe diarrhea. She stopped the Cipro after 2 days. However the diarrhea continued. The patient was having over 10 bowel movements a day. The diarrhea was watery. The patient had a GI panel done which was negative including C. difficile by EIA. The patient claims that she noticed enlarged inguinal lymph nodes at that time. Currently, although the severity of diarrhea has improved the patient continues to have mild diarrhea on daily basis. Frequency of bowel movements being 3 times a day.  The stools are described as loose and occasionally watery.  There is no nocturnal element of diarrhea. The diarrhea is not associated with tenesmus. There is no bright red blood per rectum. Previously, prior to the onset of severe diarrhea the patient had a tendency to have soft stools for the last several years. She would have 1 bowel movement a day. She denies abdominal pain. The patient also had some nausea off and on in the past. This has improved. Now the patient feels nauseated and rarely which improves with cinnamon. She denies reflux. She denies further foreign body sensation in her upper esophagus-throat area. There is no history of recent weight loss. She denies fever or chills. The patient occasionally takes ice cream. Otherwise she denies significant use of dairy products. She denies using sugar free candies, magnesium based products for herbal teas.     Review of Systems   Constitutional: Negative for appetite change, chills, fatigue, fever and unexpected weight change.   HENT: Negative for mouth sores, nosebleeds and trouble swallowing.    Eyes: Negative for discharge and redness.   Respiratory: Negative for  apnea, cough and shortness of breath.    Cardiovascular: Negative for chest pain, palpitations and leg swelling.   Gastrointestinal: Positive for diarrhea. Negative for abdominal distention, abdominal pain, anal bleeding, blood in stool, constipation, nausea and vomiting.   Endocrine: Negative for cold intolerance, heat intolerance and polydipsia.   Genitourinary: Negative for dysuria, hematuria and urgency.   Musculoskeletal: Negative for arthralgias, joint swelling and myalgias.   Skin: Negative for rash.   Allergic/Immunologic: Positive for food allergies. Negative for immunocompromised state.   Neurological: Negative for dizziness, seizures, syncope and headaches.   Hematological: Negative for adenopathy. Does not bruise/bleed easily.   Psychiatric/Behavioral: Negative for dysphoric mood. The patient is not nervous/anxious and is not hyperactive.      Patient Active Problem List   Diagnosis   • Body mass index (bmi) 33.0-33.9, adult   • Diarrhea   • Encounter for screening for other disorder   • Essential hypertension   • Hyperlipidemia LDL goal <130   • Colon cancer screening   • Type 2 diabetes mellitus without complication, without long-term current use of insulin   • Nausea   • Dysphagia     Past Medical History:   Diagnosis Date   • Diabetes 2015   • Fatigue    • High cholesterol    • HTN (hypertension) 2014   • OA (osteoarthritis) 1995   • Snores    • Tattoo    • Wears contact lenses      Past Surgical History:   Procedure Laterality Date   • COLONOSCOPY N/A 5/7/2018    Procedure: COLONOSCOPY WITH HOT SNARE POLYPECTOMY X 2; COLD SNARE POLYPECTOMY; BIOPSIES; CLIP PLACEMENT X 2;  Surgeon: Matthew Fernandez MD;  Location: [facility] ENDOSCOPY;  Service: Gastroenterology   • ENDOSCOPY N/A 5/7/2018    Procedure: ESOPHAGOGASTRODUODENOSCOPY WITH COLD BIOPSY POLYPECTOMY X 4; BIOPSIES;  Surgeon: Matthew Fernandez MD;  Location: [facility] ENDOSCOPY;  Service: Gastroenterology   • HYSTERECTOMY  1999    left 1 ovary   • ORBITAL  "RECONSTRUCTION  2008    Facial/Orbital    • REPLACEMENT TOTAL KNEE Bilateral 2014, 2015     Family History   Problem Relation Age of Onset   • Liver cancer Maternal Uncle         x 2   • Colon cancer Neg Hx    • Inflammatory bowel disease Neg Hx      Social History   Substance Use Topics   • Smoking status: Former Smoker     Packs/day: 0.50     Years: 2.00     Types: Cigarettes     Start date: 1988     Quit date: 1989   • Smokeless tobacco: Never Used   • Alcohol use Not on file      Comment: \"Social\"       Current Outpatient Prescriptions:   •  hydrochlorothiazide (HYDRODIURIL) 12.5 MG tablet, Take 12.5 mg by mouth Daily., Disp: , Rfl:   •  loratadine (CLARITIN) 10 MG tablet, Take 10 mg by mouth Daily., Disp: , Rfl:   •  losartan (COZAAR) 25 MG tablet, Take 25 mg by mouth 2 (Two) Times a Day., Disp: , Rfl:   •  metFORMIN (GLUCOPHAGE) 1000 MG tablet, Take 1,000 mg by mouth 2 (Two) Times a Day., Disp: , Rfl:   •  MULTIPLE VITAMIN PO, Take 1 tablet by mouth Daily., Disp: , Rfl:   •  Probiotic Product (PROBIOTIC DAILY PO), Take 1 capsule by mouth Daily., Disp: , Rfl:   •  raNITIdine (ZANTAC) 150 MG tablet, Take 1 tablet by mouth 2 (Two) Times a Day., Disp: 60 tablet, Rfl: 1  •  rosuvastatin (CRESTOR) 10 MG tablet, Take 10 mg by mouth Daily., Disp: , Rfl:   •  venlafaxine XR (EFFEXOR-XR) 75 MG 24 hr capsule, Take 75 mg by mouth Daily., Disp: , Rfl:     Allergies   Allergen Reactions   • Penicillins Anaphylaxis   • Codeine GI Intolerance     \"Constipation, Ringing in the ears\"   • Latex Rash       Blood pressure 133/79, pulse 95, temperature 98 °F (36.7 °C), resp. rate 16, height 154.9 cm (60.98\"), weight 80.3 kg (177 lb).    Physical Exam   Constitutional: She is oriented to person, place, and time. She appears well-developed and well-nourished. No distress.   HENT:   Head: Normocephalic and atraumatic.   Right Ear: Hearing and external ear normal.   Left Ear: Hearing and external ear normal.   Nose: Nose normal. "   Mouth/Throat: Oropharynx is clear and moist and mucous membranes are normal. Mucous membranes are not pale, not dry and not cyanotic. No oral lesions. No oropharyngeal exudate.   Eyes: Conjunctivae and EOM are normal. Right eye exhibits no discharge. Left eye exhibits no discharge. No scleral icterus.   Neck: Trachea normal. Neck supple. No JVD present. No edema present. No thyroid mass and no thyromegaly present.   Cardiovascular: Normal rate, regular rhythm, S2 normal and normal heart sounds.  Exam reveals no gallop, no S3 and no friction rub.    No murmur heard.  Pulmonary/Chest: Effort normal and breath sounds normal. No respiratory distress. She has no wheezes. She has no rales. She exhibits no tenderness.   Abdominal: Soft. Normal appearance and bowel sounds are normal. She exhibits no distension, no ascites and no mass. There is no splenomegaly or hepatomegaly. There is no tenderness. There is no rigidity, no rebound and no guarding. No hernia.   Musculoskeletal: She exhibits no tenderness or deformity.     Vascular Status -  Her right foot exhibits no edema. Her left foot exhibits no edema.  Lymphadenopathy:     She has no cervical adenopathy.        Left: No supraclavicular adenopathy present.   Neurological: She is alert and oriented to person, place, and time. She has normal strength. No cranial nerve deficit or sensory deficit. She exhibits normal muscle tone. Coordination normal.   Skin: No rash noted. She is not diaphoretic. No cyanosis. No pallor. Nails show no clubbing.   Psychiatric: She has a normal mood and affect. Her behavior is normal. Judgment and thought content normal.   Nursing note and vitals reviewed.  Stigmata of chronic liver disease:  None.  Asterixis:  None.    Laboratory Testing:   Upon review of medical records:    Dated April 7, 2018 sodium 144 potassium 3.9 chloride 102 CO2 29 BUN 12 serum creatinine 0.60 and glucose 97.  Calcium 10.0.  Total protein 8.1.  Albumin 4.40.  T bili  0.4 AST 31 ALT 70 alkaline phosphatase 80.  Lipase 131.  WBC 9.39 hemoglobin 12.6 hematocrit 38.0 MCV 89.2 platelet count 322.    Dated April 9, 2018 stool for GI panel negative.    Abdominal Imaging:  Upon review of medical records:     Dated April 7, 2018 the patient underwent a CT of the abdomen and pelvis with contrast which revealed: Bilateral lower lobe atelectasis.  Liver is low and attenuation consistent with fatty infiltration.  Intermediate attenuation within the gallbladder is consistent with sludge or small stones.  Spleen, pancreas, adrenal glands and kidneys are unremarkable.  Bowel gas pattern is nonobstructive.  Appendix appears normal.  No wall thickening or mesenteric inflammatory stranding.  Bladder is normal in contour.  No free fluid, free air or adenopathy.    Procedures:  Upon review of medical records:      Dated May 7, 2018 the patient underwent a colonoscopy to the terminal ileum which revealed: Diverticulosis involving the transverse colon and right colon.  Colon polyps.  3 were removed.  5, 10 and 12 mm in size.  The patient was noted to have a single erosion in the descending colon which was biopsied.  Additionally random biopsies were obtained from the colon.  Small bowel, terminal ileum, biopsies revealed no pathologic alterations.  Random colon biopsies revealed no pathologic alterations.  Transverse colon polyp, biopsy revealed tubular adenoma without high-grade dysplasia.  Descending colon polyp, biopsy revealed tubular adenoma fragments.  Sigmoid colon polyp, biopsy revealed tubular adenoma without high-grade dysplasia.  Distal Descending colon, erosion, biopsy revealed reactive colon mucosa with fibrosis and minimal chronic inflammation.  Negative for active inflammation or atypia.    Dated May 7, 2018 the patient underwent an upper endoscopy which revealed: Esophageal polyps with appearance suggestive of squamous papilloma.  Erythematous-erosive gastritis with evidence of old  healed ulceration.  Gastric polyp.  Small sliding hiatal hernia.  No Walker’s esophagus.  Second portion of duodenum, biopsy revealed no pathologic alterations.  Negative for celiac disease, microorganisms, metaplasia or atypia.  Stomach, antrum, greater curve, and body, biopsies revealed reactive gastropathy with minimal chronic gastritis.  Negative for H. pylori, metaplasia, dysplasia or malignancy.  Gastric polyp, fundus, biopsy revealed fundic gland polyp.  Negative for H. pylori, metaplasia, dysplasia or malignancy.  Proximal Esophagus, polyp at 20 cm, likely squamous papilloma, biopsy revealed papillary squamous hyperplasia with features consistent with reflux esophagitis (no eosinophils identified).  Negative for metaplasia, dysplasia or malignancy.  Esophagus, polyp 18 cm, biopsy revealed papillary squamous hyperplasia with features consistent with reflux esophagitis (no eosinophils identified).  Negative for metaplasia, dysplasia or malignancy.    Assessment:      ICD-10-CM ICD-9-CM   1. Diarrhea, unspecified type R19.7 787.91   2. Adenomatous polyp of transverse colon D12.3 211.3   3. Diverticulosis of colon without hemorrhage K57.30 562.10   4. Nausea R11.0 787.02   5. Esophageal polyp K22.8 530.89   6. Other chronic gastritis without hemorrhage K29.50 535.10   7. Over weight E66.3 278.02         Discussion:  1. Differential diagnosis of her diarrhea includes C. difficile, metformin, postinfectious IBS type picture, and Ernestina enteropathy-celiac disease like picture.  2. Her nausea has significantly improved.    Plan/  Patient Instructions   1. Low-fat-low lactose-consistent carbohydrate diet.  2. Check stool for C. difficile by EIA if negative by PCR. The patient may call back regarding the results.  3. Zantac 150 mg 1 by mouth twice a day.  4. If the stool studies are negative for C. difficile a trial of low-dose Imodium is recommended.  5. Follow-up colonoscopy is recommended in 3 years. May  2021(large colon polyps > 10 mm tubular adenomata).  6. The patient may call back regarding progress in 1-2 weeks.   7. Follow-up in 3-4 weeks especially if the symptoms persist.  8. Discussed with the patient in detail. Opportunity was given to ask questions.       Matthew Fernandez MD

## 2018-06-05 NOTE — PATIENT INSTRUCTIONS
1. Low-fat-low lactose-consistent carbohydrate diet.  2. Check stool for C. difficile by EIA if negative by PCR. The patient may call back regarding the results.  3. Zantac 150 mg 1 by mouth twice a day.  4. If the stool studies are negative for C. difficile a trial of low-dose Imodium is recommended.  5. Follow-up colonoscopy is recommended in 3 years. May 2021(large colon polyps > 10 mm tubular adenomata).  6. The patient may call back regarding progress in 1-2 weeks.   7. Follow-up in 3-4 weeks especially if the symptoms persist.  8. Discussed with the patient in detail. Opportunity was given to ask questions.

## 2018-07-02 RX ORDER — RANITIDINE 150 MG/1
TABLET ORAL
Qty: 60 TABLET | Refills: 0 | OUTPATIENT
Start: 2018-07-02

## 2018-07-02 NOTE — TELEPHONE ENCOUNTER
CALLED RANITIDINE 150 MG #60 TAKE 1 TWICE DAILY TO ARELY IN [facility] WITH 3 REFILLS.

## 2018-07-18 ENCOUNTER — TELEPHONE (OUTPATIENT)
Dept: GASTROENTEROLOGY | Facility: CLINIC | Age: 53
End: 2018-07-18

## 2018-07-18 NOTE — TELEPHONE ENCOUNTER
FOLLOWING UP ON OVERDUE C-DIFF TEST. PATIENT STATES SHE IS FEELING MUCH BETTER AND IS NORMAL AGAIN. WILL CANCEL LAB ORDER.

## 2024-09-16 NOTE — OP NOTE
PROCEDURE:  Colonoscopy to the terminal ileum with 2. Hot snare, one cold snare polypectomies as well as biopsies and placement of 2 resolution clips to coapt the margins.      DATE OF PROCEDURE:  May 7, 2018    REFERRING PROVIDER:  CHANCE Ennis     INSTRUMENT USED:  Olympus PCF H180 AL videocolonoscope.       INDICATIONS OF THE PROCEDURE: this is a 52-year-old female for colon cancer screening. There is history of diarrhea.    PREVIOUS COLONOSCOPY: none.    FAMILY HISTORY OF COLON CANCER:  None.    BIOPSIES:  Biopsies were obtained from the terminal ileum and randomly from the colon including rectum. Transverse colon: 1 cold snare polypectomy. Descending colon: 1 hot snare polypectomy Sigmoid colon: 1 hot snare polypectomy. Distal descending colon. Biopsies were obtained from an area of erosion.      PHOTOGRAPHS:  Photographs were included in the medical records.     MEDICATIONS:  MAC.       CONSENT/PREPROCEDURE EVALUATION:  Risks, benefits, alternatives and options of the procedure including risks of sedation/anesthesia were discussed with the patient and informed consent was obtained prior to the procedure.  History and physical examination were performed and nothing precluded the test.      REPORT:  The patient was placed in left lateral decubitus position and a digital examination was performed.  Once under the influence of IV sedation, the instrument was inserted into the rectum and advanced under direct vision to cecum which was identified by the ileocecal valve, triradiate folds and appendiceal orifice. The scope was then maneuvered into the terminal ileum.        FINDINGS:      Digital rectal examination:  Good anal tone.  No perianal pathology.  No mass.        Terminal ileum:  7-8 cm.  Normal.     Cecum and ascending colon: Diverticulosis.       Hepatic flexure, transverse colon, splenic flexure:  Diverticulosis. A 5 mm sessile polyp in the transverse colon was removed with cold snare.         Descending colon, sigmoid colon and rectum: a 10 mm sessile polyp was removed from the descending colon by hot snare. Margins were coapt using a resolution clip.  A 12 mm sessile polyp in the sigmoid colon was removed with hot snare. Margins were coapt using a resolution clip. In the descending colon a localized area of erosion was seen. This was biopsied.  Additional biopsies obtained randomly from the colon including rectum. A retroflex examination within the rectum revealed internal hemorrhoids.        The scope was then straightened, the lower GI tract was decompressed, and the scope was pulled out of the patient.  The patient tolerated the procedure well.  There were no immediate complications and the patient was transferred in stable condition for post procedure observation.      TECHNICAL DATA:   1. [place] prep score: 8 (3+2+3).    2. Anus to cecal time: 3  minutes.  3. Difficulty of examination:  Average.    4. Withdrawal time: 13 minutes.  5. Procedure time: 27 minutes  6. Retroflex examination in right colon: No secondary to diverticulosis in the right colon and redundant nature..    7. Second look Rectum to cecum with decompression: Yes.    DIAGNOSES:    1. Diverticulosis involving the transverse colon and right colon.  2. Colon polyps. 3 were removed. 5,10 and 12 mm in size.  3. The patient was noted to have a single erosion in the descending colon which was biopsied. Additional random biopsies were obtained from the colon.    RECOMMENDATIONS:     1. Dietary instructions.  2. Follow biopsies.    3. Follow-up: 2-3 weeks.     4. Followup colonoscopy: 3 years in view of colonic polyp size > 10 mm.      Thank you very much for letting me participate in the care of this patient. Please do not hesitate to call me if you have any questions.       Strong peripheral pulses

## (undated) DEVICE — SNAR POLYP SENSATION STDOVL 27 240 BX40

## (undated) DEVICE — TRAP,MUCUS SPECIMEN,40CC: Brand: MEDLINE

## (undated) DEVICE — 2000CC GUARDIAN II: Brand: GUARDIAN

## (undated) DEVICE — ENDOGATOR AUXILIARY WATER JET CONNECTOR: Brand: ENDOGATOR

## (undated) DEVICE — CONMED SCOPE SAVER BITE BLOCK, 20X27 MM: Brand: SCOPE SAVER

## (undated) DEVICE — Device

## (undated) DEVICE — PAD GRND REM POLYHESIVE A/ DISP

## (undated) DEVICE — JELLY,LUBE,STERILE,FLIP TOP,TUBE,2-OZ: Brand: MEDLINE

## (undated) DEVICE — FRCP BIOP COLD ENDOJAW ALLGTR W/NDL 2.8X2300MM BLU